# Patient Record
(demographics unavailable — no encounter records)

---

## 2024-10-11 NOTE — HEALTH RISK ASSESSMENT
[Yes] : Yes [2 - 4 times a month (2 pts)] : 2-4 times a month (2 points) [1 or 2 (0 pts)] : 1 or 2 (0 points) [Never (0 pts)] : Never (0 points) [No] : In the past 12 months have you used drugs other than those required for medical reasons? No [No falls in past year] : Patient reported no falls in the past year [0] : 2) Feeling down, depressed, or hopeless: Not at all (0) [PHQ-2 Negative - No further assessment needed] : PHQ-2 Negative - No further assessment needed [Patient reported mammogram was normal] : Patient reported mammogram was normal [Patient reported PAP Smear was normal] : Patient reported PAP Smear was normal [None] : None [With Family] : lives with family [Employed] : employed [] :  [Fully functional (bathing, dressing, toileting, transferring, walking, feeding)] : Fully functional (bathing, dressing, toileting, transferring, walking, feeding) [Fully functional (using the telephone, shopping, preparing meals, housekeeping, doing laundry, using] : Fully functional and needs no help or supervision to perform IADLs (using the telephone, shopping, preparing meals, housekeeping, doing laundry, using transportation, managing medications and managing finances) [Former] : Former [15-19] : 15-19 [< 15 Years] : < 15 Years [HIV test declined] : HIV test declined [Hepatitis C test declined] : Hepatitis C test declined [Audit-CScore] : 2 [de-identified] : walking [de-identified] : food aversions, limited [[id number]] : 0 [Change in mental status noted] : No change in mental status noted [MammogramDate] : 06/23 [PapSmearDate] : 08/24 [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

## 2024-10-11 NOTE — HISTORY OF PRESENT ILLNESS
[FreeTextEntry1] : Establish care, CPE [de-identified] : 41 yo female presents to [facility], CPE. Last CPE was about 1-2 years ago. Patient has hx anxiety, currently on Wellbutrin 150mg qd, fluoxetine 60mg qd. Patient follows with psychiatrist regularly, symptoms well controlled. Patient also on mounjaro 7.5mg weekly for elevated blood glucose/weight loss - previously followed with Endo and nutritionist but no longer does. States her dose is too high, she is having some side effects including food aversion, GI upset. Patient has lost over 40 lbs since being on mounjaro, wants to maintain current weight. Patient had mammogram with [facility] June 2023, WNL. Last pap smear was Aug 2024, WNL. Patient c/o fatigue, leg aching today.

## 2024-10-11 NOTE — REVIEW OF SYSTEMS
[Abdominal Pain] : abdominal pain [Nausea] : nausea [Constipation] : constipation [Diarrhea] : diarrhea [Vomiting] : vomiting [Negative] : Psychiatric [Fatigue] : fatigue [Fever] : no fever [Chills] : no chills [FreeTextEntry9] : leg achiness